# Patient Record
Sex: MALE | Race: WHITE | NOT HISPANIC OR LATINO | Employment: OTHER | URBAN - METROPOLITAN AREA
[De-identification: names, ages, dates, MRNs, and addresses within clinical notes are randomized per-mention and may not be internally consistent; named-entity substitution may affect disease eponyms.]

---

## 2022-07-09 ENCOUNTER — APPOINTMENT (EMERGENCY)
Dept: RADIOLOGY | Facility: HOSPITAL | Age: 37
DRG: 493 | End: 2022-07-09
Payer: COMMERCIAL

## 2022-07-09 ENCOUNTER — HOSPITAL ENCOUNTER (EMERGENCY)
Facility: HOSPITAL | Age: 37
Discharge: ANOTHER HOSPITAL | End: 2022-07-09
Attending: EMERGENCY MEDICINE
Payer: COMMERCIAL

## 2022-07-09 ENCOUNTER — APPOINTMENT (INPATIENT)
Dept: RADIOLOGY | Facility: HOSPITAL | Age: 37
DRG: 493 | End: 2022-07-09
Payer: COMMERCIAL

## 2022-07-09 ENCOUNTER — HOSPITAL ENCOUNTER (INPATIENT)
Facility: HOSPITAL | Age: 37
LOS: 4 days | Discharge: HOME | DRG: 493 | End: 2022-07-13
Attending: SURGERY | Admitting: SURGERY
Payer: COMMERCIAL

## 2022-07-09 ENCOUNTER — APPOINTMENT (EMERGENCY)
Dept: RADIOLOGY | Facility: HOSPITAL | Age: 37
End: 2022-07-09
Payer: COMMERCIAL

## 2022-07-09 VITALS
SYSTOLIC BLOOD PRESSURE: 149 MMHG | BODY MASS INDEX: 26.87 KG/M2 | HEART RATE: 74 BPM | OXYGEN SATURATION: 99 % | WEIGHT: 198.41 LBS | DIASTOLIC BLOOD PRESSURE: 86 MMHG | RESPIRATION RATE: 18 BRPM | HEIGHT: 72 IN

## 2022-07-09 DIAGNOSIS — S82.202A CLOSED FRACTURE OF LEFT TIBIA AND FIBULA, INITIAL ENCOUNTER: Primary | ICD-10-CM

## 2022-07-09 DIAGNOSIS — S82.402A CLOSED FRACTURE OF LEFT TIBIA AND FIBULA, INITIAL ENCOUNTER: Primary | ICD-10-CM

## 2022-07-09 DIAGNOSIS — S82.209A TIBIAL FRACTURE: ICD-10-CM

## 2022-07-09 DIAGNOSIS — S51.011A ELBOW LACERATION, RIGHT, INITIAL ENCOUNTER: ICD-10-CM
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
## 2022-07-09 PROBLEM — F43.10 PTSD (POST-TRAUMATIC STRESS DISORDER): Status: ACTIVE | Noted: 2022-07-09

## 2022-07-09 PROBLEM — M25.532 LEFT WRIST PAIN: Status: ACTIVE | Noted: 2022-07-09
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
## 2022-07-09 LAB
ALBUMIN SERPL BCP-MCNC: 4.1 G/DL (ref 3.5–5)
ALP SERPL-CCNC: 55 U/L (ref 46–116)
ALT SERPL W P-5'-P-CCNC: 30 U/L (ref 12–78)
ANION GAP SERPL CALCULATED.3IONS-SCNC: 12 MMOL/L (ref 4–13)
APTT PPP: 24 SECONDS (ref 23–37)
AST SERPL W P-5'-P-CCNC: 25 U/L (ref 5–45)
ATRIAL RATE: 65 BPM
BASOPHILS # BLD AUTO: 0.05 THOUSANDS/ΜL (ref 0–0.1)
BASOPHILS NFR BLD AUTO: 1 % (ref 0–1)
BILIRUB SERPL-MCNC: 0.75 MG/DL (ref 0.2–1)
BUN SERPL-MCNC: 18 MG/DL (ref 5–25)
CALCIUM SERPL-MCNC: 9.1 MG/DL (ref 8.3–10.1)
CHLORIDE SERPL-SCNC: 106 MMOL/L (ref 100–108)
CO2 SERPL-SCNC: 24 MMOL/L (ref 21–32)
CREAT SERPL-MCNC: 1.27 MG/DL (ref 0.6–1.3)
EOSINOPHIL # BLD AUTO: 0.03 THOUSAND/ΜL (ref 0–0.61)
EOSINOPHIL NFR BLD AUTO: 0 % (ref 0–6)
ERYTHROCYTE [DISTWIDTH] IN BLOOD BY AUTOMATED COUNT: 12.7 % (ref 11.6–15.1)
GFR SERPL CREATININE-BSD FRML MDRD: 70 ML/MIN/1.73SQ M
GLUCOSE SERPL-MCNC: 110 MG/DL (ref 65–140)
HCT VFR BLD AUTO: 42.3 % (ref 36.5–49.3)
HGB BLD-MCNC: 14.1 G/DL (ref 12–17)
IMM GRANULOCYTES # BLD AUTO: 0.03 THOUSAND/UL (ref 0–0.2)
IMM GRANULOCYTES NFR BLD AUTO: 0 % (ref 0–2)
INR PPP: 0.98 (ref 0.84–1.19)
LYMPHOCYTES # BLD AUTO: 1.76 THOUSANDS/ΜL (ref 0.6–4.47)
LYMPHOCYTES NFR BLD AUTO: 19 % (ref 14–44)
MCH RBC QN AUTO: 31.4 PG (ref 26.8–34.3)
MCHC RBC AUTO-ENTMCNC: 33.3 G/DL (ref 31.4–37.4)
MCV RBC AUTO: 94 FL (ref 82–98)
MONOCYTES # BLD AUTO: 0.88 THOUSAND/ΜL (ref 0.17–1.22)
MONOCYTES NFR BLD AUTO: 10 % (ref 4–12)
NEUTROPHILS # BLD AUTO: 6.47 THOUSANDS/ΜL (ref 1.85–7.62)
NEUTS SEG NFR BLD AUTO: 70 % (ref 43–75)
NRBC BLD AUTO-RTO: 0 /100 WBCS
P AXIS: 45 DEGREES
PLATELET # BLD AUTO: 217 THOUSANDS/UL (ref 149–390)
PMV BLD AUTO: 10.2 FL (ref 8.9–12.7)
POTASSIUM SERPL-SCNC: 3.9 MMOL/L (ref 3.5–5.3)
PR INTERVAL: 136 MS
PROT SERPL-MCNC: 7.2 G/DL (ref 6.4–8.2)
PROTHROMBIN TIME: 12.8 SECONDS (ref 11.6–14.5)
QRS AXIS: 62 DEGREES
QRSD INTERVAL: 90 MS
QT INTERVAL: 396 MS
QTC INTERVAL: 411 MS
RBC # BLD AUTO: 4.49 MILLION/UL (ref 3.88–5.62)
SODIUM SERPL-SCNC: 142 MMOL/L (ref 136–145)
T WAVE AXIS: 53 DEGREES
VENTRICULAR RATE: 65 BPM
WBC # BLD AUTO: 9.22 THOUSAND/UL (ref 4.31–10.16)

## 2022-07-09 PROCEDURE — 90471 IMMUNIZATION ADMIN: CPT

## 2022-07-09 PROCEDURE — 90715 TDAP VACCINE 7 YRS/> IM: CPT | Performed by: EMERGENCY MEDICINE

## 2022-07-09 PROCEDURE — 96374 THER/PROPH/DIAG INJ IV PUSH: CPT

## 2022-07-09 PROCEDURE — 99285 EMERGENCY DEPT VISIT HI MDM: CPT

## 2022-07-09 PROCEDURE — 73590 X-RAY EXAM OF LOWER LEG: CPT

## 2022-07-09 PROCEDURE — 85730 THROMBOPLASTIN TIME PARTIAL: CPT | Performed by: EMERGENCY MEDICINE

## 2022-07-09 PROCEDURE — 0HQDXZZ REPAIR RIGHT LOWER ARM SKIN, EXTERNAL APPROACH: ICD-10-PCS | Performed by: SURGERY

## 2022-07-09 PROCEDURE — 80053 COMPREHEN METABOLIC PANEL: CPT | Performed by: EMERGENCY MEDICINE

## 2022-07-09 PROCEDURE — 12002 RPR S/N/AX/GEN/TRNK2.6-7.5CM: CPT | Performed by: SURGERY

## 2022-07-09 PROCEDURE — 73110 X-RAY EXAM OF WRIST: CPT

## 2022-07-09 PROCEDURE — 93010 ELECTROCARDIOGRAM REPORT: CPT | Performed by: INTERNAL MEDICINE

## 2022-07-09 PROCEDURE — 93005 ELECTROCARDIOGRAM TRACING: CPT

## 2022-07-09 PROCEDURE — 73080 X-RAY EXAM OF ELBOW: CPT

## 2022-07-09 PROCEDURE — 36415 COLL VENOUS BLD VENIPUNCTURE: CPT | Performed by: EMERGENCY MEDICINE

## 2022-07-09 PROCEDURE — 73700 CT LOWER EXTREMITY W/O DYE: CPT

## 2022-07-09 PROCEDURE — 99285 EMERGENCY DEPT VISIT HI MDM: CPT | Performed by: EMERGENCY MEDICINE

## 2022-07-09 PROCEDURE — 71045 X-RAY EXAM CHEST 1 VIEW: CPT

## 2022-07-09 PROCEDURE — G1004 CDSM NDSC: HCPCS

## 2022-07-09 PROCEDURE — 85610 PROTHROMBIN TIME: CPT | Performed by: EMERGENCY MEDICINE

## 2022-07-09 PROCEDURE — 96376 TX/PRO/DX INJ SAME DRUG ADON: CPT

## 2022-07-09 PROCEDURE — 85025 COMPLETE CBC W/AUTO DIFF WBC: CPT | Performed by: EMERGENCY MEDICINE

## 2022-07-09 PROCEDURE — 99223 1ST HOSP IP/OBS HIGH 75: CPT | Performed by: SURGERY

## 2022-07-09 PROCEDURE — 73090 X-RAY EXAM OF FOREARM: CPT

## 2022-07-09 RX ORDER — GINSENG 100 MG
1 CAPSULE ORAL ONCE
Status: COMPLETED | OUTPATIENT
Start: 2022-07-09 | End: 2022-07-09

## 2022-07-09 RX ORDER — OXYCODONE HYDROCHLORIDE 10 MG/1
10 TABLET ORAL EVERY 4 HOURS PRN
Status: DISCONTINUED | OUTPATIENT
Start: 2022-07-09 | End: 2022-07-11

## 2022-07-09 RX ORDER — OXYCODONE HYDROCHLORIDE 5 MG/1
5 TABLET ORAL EVERY 4 HOURS PRN
Status: DISCONTINUED | OUTPATIENT
Start: 2022-07-09 | End: 2022-07-11

## 2022-07-09 RX ORDER — ACETAMINOPHEN 325 MG/1
975 TABLET ORAL EVERY 6 HOURS SCHEDULED
Status: DISCONTINUED | OUTPATIENT
Start: 2022-07-10 | End: 2022-07-13 | Stop reason: HOSPADM

## 2022-07-09 RX ORDER — GABAPENTIN 100 MG/1
100 CAPSULE ORAL
COMMUNITY
End: 2022-07-13

## 2022-07-09 RX ORDER — HYDROMORPHONE HCL/PF 1 MG/ML
1 SYRINGE (ML) INJECTION ONCE
Status: COMPLETED | OUTPATIENT
Start: 2022-07-09 | End: 2022-07-09

## 2022-07-09 RX ORDER — SODIUM CHLORIDE, SODIUM GLUCONATE, SODIUM ACETATE, POTASSIUM CHLORIDE, MAGNESIUM CHLORIDE, SODIUM PHOSPHATE, DIBASIC, AND POTASSIUM PHOSPHATE .53; .5; .37; .037; .03; .012; .00082 G/100ML; G/100ML; G/100ML; G/100ML; G/100ML; G/100ML; G/100ML
125 INJECTION, SOLUTION INTRAVENOUS CONTINUOUS
Status: DISCONTINUED | OUTPATIENT
Start: 2022-07-09 | End: 2022-07-10

## 2022-07-09 RX ORDER — DULOXETIN HYDROCHLORIDE 60 MG/1
160 CAPSULE, DELAYED RELEASE ORAL
COMMUNITY

## 2022-07-09 RX ORDER — AMOXICILLIN 250 MG
2 CAPSULE ORAL DAILY
Status: DISCONTINUED | OUTPATIENT
Start: 2022-07-10 | End: 2022-07-13 | Stop reason: HOSPADM

## 2022-07-09 RX ORDER — ENOXAPARIN SODIUM 100 MG/ML
30 INJECTION SUBCUTANEOUS EVERY 12 HOURS
Status: DISCONTINUED | OUTPATIENT
Start: 2022-07-09 | End: 2022-07-10

## 2022-07-09 RX ORDER — GABAPENTIN 100 MG/1
100 CAPSULE ORAL
Status: DISCONTINUED | OUTPATIENT
Start: 2022-07-09 | End: 2022-07-11

## 2022-07-09 RX ORDER — FENTANYL CITRATE 50 UG/ML
1 INJECTION, SOLUTION INTRAMUSCULAR; INTRAVENOUS ONCE
Status: COMPLETED | OUTPATIENT
Start: 2022-07-09 | End: 2022-07-09

## 2022-07-09 RX ORDER — LIDOCAINE HYDROCHLORIDE AND EPINEPHRINE 10; 10 MG/ML; UG/ML
10 INJECTION, SOLUTION INFILTRATION; PERINEURAL ONCE
Status: COMPLETED | OUTPATIENT
Start: 2022-07-09 | End: 2022-07-09

## 2022-07-09 RX ORDER — HYDROMORPHONE HCL/PF 1 MG/ML
0.5 SYRINGE (ML) INJECTION
Status: DISCONTINUED | OUTPATIENT
Start: 2022-07-09 | End: 2022-07-13

## 2022-07-09 RX ADMIN — GABAPENTIN 100 MG: 100 CAPSULE ORAL at 23:07

## 2022-07-09 RX ADMIN — ENOXAPARIN SODIUM 30 MG: 30 INJECTION SUBCUTANEOUS at 23:12

## 2022-07-09 RX ADMIN — BACITRACIN 1 SMALL APPLICATION: 500 OINTMENT TOPICAL at 22:24

## 2022-07-09 RX ADMIN — SODIUM CHLORIDE, SODIUM GLUCONATE, SODIUM ACETATE, POTASSIUM CHLORIDE, MAGNESIUM CHLORIDE, SODIUM PHOSPHATE, DIBASIC, AND POTASSIUM PHOSPHATE 125 ML/HR: .53; .5; .37; .037; .03; .012; .00082 INJECTION, SOLUTION INTRAVENOUS at 23:07

## 2022-07-09 RX ADMIN — HYDROMORPHONE HYDROCHLORIDE 1 MG: 1 INJECTION, SOLUTION INTRAMUSCULAR; INTRAVENOUS; SUBCUTANEOUS at 19:48

## 2022-07-09 RX ADMIN — HYDROMORPHONE HYDROCHLORIDE 1 MG: 1 INJECTION, SOLUTION INTRAMUSCULAR; INTRAVENOUS; SUBCUTANEOUS at 18:48

## 2022-07-09 RX ADMIN — TETANUS TOXOID, REDUCED DIPHTHERIA TOXOID AND ACELLULAR PERTUSSIS VACCINE, ADSORBED 0.5 ML: 5; 2.5; 8; 8; 2.5 SUSPENSION INTRAMUSCULAR at 18:46

## 2022-07-09 RX ADMIN — LIDOCAINE HYDROCHLORIDE,EPINEPHRINE BITARTRATE 10 ML: 10; .01 INJECTION, SOLUTION INFILTRATION; PERINEURAL at 21:22

## 2022-07-09 RX ADMIN — ACETAMINOPHEN 975 MG: 325 TABLET, FILM COATED ORAL at 23:07

## 2022-07-09 RX ADMIN — HYDROMORPHONE HYDROCHLORIDE 1 MG: 1 INJECTION, SOLUTION INTRAMUSCULAR; INTRAVENOUS; SUBCUTANEOUS at 22:23

## 2022-07-09 NOTE — ED TRIAGE NOTES
Pt  Right elbow wrapped in cling - undressed & cleansed, left leg deformed  Pt  Repositioned in bed with distress
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
## 2022-07-10 ENCOUNTER — ANESTHESIA (INPATIENT)
Dept: PERIOP | Facility: HOSPITAL | Age: 37
DRG: 493 | End: 2022-07-10
Payer: COMMERCIAL

## 2022-07-10 ENCOUNTER — APPOINTMENT (INPATIENT)
Dept: RADIOLOGY | Facility: HOSPITAL | Age: 37
DRG: 493 | End: 2022-07-10
Payer: COMMERCIAL

## 2022-07-10 ENCOUNTER — ANESTHESIA EVENT (INPATIENT)
Dept: PERIOP | Facility: HOSPITAL | Age: 37
DRG: 493 | End: 2022-07-10
Payer: COMMERCIAL

## 2022-07-10 LAB
ABO GROUP BLD: NORMAL
ABO GROUP BLD: NORMAL
ANION GAP SERPL CALCULATED.3IONS-SCNC: 2 MMOL/L (ref 4–13)
BLD GP AB SCN SERPL QL: NEGATIVE
BUN SERPL-MCNC: 20 MG/DL (ref 5–25)
CALCIUM SERPL-MCNC: 8.6 MG/DL (ref 8.3–10.1)
CHLORIDE SERPL-SCNC: 110 MMOL/L (ref 100–108)
CO2 SERPL-SCNC: 26 MMOL/L (ref 21–32)
CREAT SERPL-MCNC: 1.2 MG/DL (ref 0.6–1.3)
ERYTHROCYTE [DISTWIDTH] IN BLOOD BY AUTOMATED COUNT: 13 % (ref 11.6–15.1)
GFR SERPL CREATININE-BSD FRML MDRD: 75 ML/MIN/1.73SQ M
GLUCOSE SERPL-MCNC: 95 MG/DL (ref 65–140)
HCT VFR BLD AUTO: 38.5 % (ref 36.5–49.3)
HGB BLD-MCNC: 12.9 G/DL (ref 12–17)
MCH RBC QN AUTO: 31.9 PG (ref 26.8–34.3)
MCHC RBC AUTO-ENTMCNC: 33.5 G/DL (ref 31.4–37.4)
MCV RBC AUTO: 95 FL (ref 82–98)
PLATELET # BLD AUTO: 179 THOUSANDS/UL (ref 149–390)
PMV BLD AUTO: 10.4 FL (ref 8.9–12.7)
POTASSIUM SERPL-SCNC: 4.8 MMOL/L (ref 3.5–5.3)
RBC # BLD AUTO: 4.05 MILLION/UL (ref 3.88–5.62)
RH BLD: POSITIVE
RH BLD: POSITIVE
SODIUM SERPL-SCNC: 138 MMOL/L (ref 136–145)
SPECIMEN EXPIRATION DATE: NORMAL
WBC # BLD AUTO: 9.89 THOUSAND/UL (ref 4.31–10.16)

## 2022-07-10 PROCEDURE — 27759 TREATMENT OF TIBIA FRACTURE: CPT | Performed by: ORTHOPAEDIC SURGERY

## 2022-07-10 PROCEDURE — 36415 COLL VENOUS BLD VENIPUNCTURE: CPT | Performed by: PHYSICIAN ASSISTANT

## 2022-07-10 PROCEDURE — 0QSH04Z REPOSITION LEFT TIBIA WITH INTERNAL FIXATION DEVICE, OPEN APPROACH: ICD-10-PCS | Performed by: SURGERY

## 2022-07-10 PROCEDURE — C1713 ANCHOR/SCREW BN/BN,TIS/BN: HCPCS | Performed by: ORTHOPAEDIC SURGERY

## 2022-07-10 PROCEDURE — 86901 BLOOD TYPING SEROLOGIC RH(D): CPT | Performed by: PHYSICIAN ASSISTANT

## 2022-07-10 PROCEDURE — 85027 COMPLETE CBC AUTOMATED: CPT | Performed by: EMERGENCY MEDICINE

## 2022-07-10 PROCEDURE — 27769 OPTX POST ANKLE FX: CPT | Performed by: ORTHOPAEDIC SURGERY

## 2022-07-10 PROCEDURE — 99223 1ST HOSP IP/OBS HIGH 75: CPT | Performed by: ORTHOPAEDIC SURGERY

## 2022-07-10 PROCEDURE — C1769 GUIDE WIRE: HCPCS | Performed by: ORTHOPAEDIC SURGERY

## 2022-07-10 PROCEDURE — 0QSH06Z REPOSITION LEFT TIBIA WITH INTRAMEDULLARY INTERNAL FIXATION DEVICE, OPEN APPROACH: ICD-10-PCS | Performed by: SURGERY

## 2022-07-10 PROCEDURE — 86850 RBC ANTIBODY SCREEN: CPT | Performed by: PHYSICIAN ASSISTANT

## 2022-07-10 PROCEDURE — 99233 SBSQ HOSP IP/OBS HIGH 50: CPT | Performed by: SURGERY

## 2022-07-10 PROCEDURE — 86900 BLOOD TYPING SEROLOGIC ABO: CPT | Performed by: PHYSICIAN ASSISTANT

## 2022-07-10 PROCEDURE — 80048 BASIC METABOLIC PNL TOTAL CA: CPT | Performed by: EMERGENCY MEDICINE

## 2022-07-10 PROCEDURE — 73590 X-RAY EXAM OF LOWER LEG: CPT

## 2022-07-10 DEVICE — TIBIAL NAIL-ADVANCED / 10MM 375MM / STERILE: Type: IMPLANTABLE DEVICE | Site: TIBIA | Status: FUNCTIONAL

## 2022-07-10 DEVICE — 2.7MM CORTEX SCREW SELF-TAPPING 45MM: Type: IMPLANTABLE DEVICE | Site: TIBIA | Status: FUNCTIONAL

## 2022-07-10 DEVICE — LOCKING SCREW FOR IM NAIL Ø 5MM/ 38MM/ XL25/ STERILE: Type: IMPLANTABLE DEVICE | Site: TIBIA | Status: FUNCTIONAL

## 2022-07-10 DEVICE — LOCKING SCREW FOR IM NAIL Ø 5MM/ 48MM/ XL25/ STERILE: Type: IMPLANTABLE DEVICE | Site: TIBIA | Status: FUNCTIONAL

## 2022-07-10 DEVICE — LOCKING SCREW FOR IM NAIL Ø 5MM/ 42MM/ XL25/ STERILE: Type: IMPLANTABLE DEVICE | Site: TIBIA | Status: FUNCTIONAL

## 2022-07-10 DEVICE — LOCKING SCREW FOR IM NAIL Ø 5MM/ 40MM/ XL25/ STERILE: Type: IMPLANTABLE DEVICE | Site: TIBIA | Status: FUNCTIONAL

## 2022-07-10 RX ORDER — ENOXAPARIN SODIUM 100 MG/ML
30 INJECTION SUBCUTANEOUS EVERY 12 HOURS
Status: DISCONTINUED | OUTPATIENT
Start: 2022-07-10 | End: 2022-07-13 | Stop reason: HOSPADM

## 2022-07-10 RX ORDER — CEFAZOLIN SODIUM 2 G/50ML
2000 SOLUTION INTRAVENOUS
Status: COMPLETED | OUTPATIENT
Start: 2022-07-10 | End: 2022-07-10

## 2022-07-10 RX ORDER — CEFAZOLIN SODIUM 2 G/50ML
2000 SOLUTION INTRAVENOUS EVERY 8 HOURS
Status: COMPLETED | OUTPATIENT
Start: 2022-07-10 | End: 2022-07-11

## 2022-07-10 RX ORDER — HYDROMORPHONE HCL/PF 1 MG/ML
SYRINGE (ML) INJECTION AS NEEDED
Status: DISCONTINUED | OUTPATIENT
Start: 2022-07-10 | End: 2022-07-10

## 2022-07-10 RX ORDER — NEOSTIGMINE METHYLSULFATE 1 MG/ML
INJECTION INTRAVENOUS AS NEEDED
Status: DISCONTINUED | OUTPATIENT
Start: 2022-07-10 | End: 2022-07-10

## 2022-07-10 RX ORDER — PROPOFOL 10 MG/ML
INJECTION, EMULSION INTRAVENOUS AS NEEDED
Status: DISCONTINUED | OUTPATIENT
Start: 2022-07-10 | End: 2022-07-10

## 2022-07-10 RX ORDER — FENTANYL CITRATE/PF 50 MCG/ML
25 SYRINGE (ML) INJECTION
Status: DISCONTINUED | OUTPATIENT
Start: 2022-07-10 | End: 2022-07-10 | Stop reason: HOSPADM

## 2022-07-10 RX ORDER — ONDANSETRON 2 MG/ML
INJECTION INTRAMUSCULAR; INTRAVENOUS AS NEEDED
Status: DISCONTINUED | OUTPATIENT
Start: 2022-07-10 | End: 2022-07-10

## 2022-07-10 RX ORDER — SODIUM CHLORIDE, SODIUM GLUCONATE, SODIUM ACETATE, POTASSIUM CHLORIDE, MAGNESIUM CHLORIDE, SODIUM PHOSPHATE, DIBASIC, AND POTASSIUM PHOSPHATE .53; .5; .37; .037; .03; .012; .00082 G/100ML; G/100ML; G/100ML; G/100ML; G/100ML; G/100ML; G/100ML
75 INJECTION, SOLUTION INTRAVENOUS CONTINUOUS
Status: DISPENSED | OUTPATIENT
Start: 2022-07-10 | End: 2022-07-10

## 2022-07-10 RX ORDER — DEXAMETHASONE SODIUM PHOSPHATE 10 MG/ML
INJECTION, SOLUTION INTRAMUSCULAR; INTRAVENOUS AS NEEDED
Status: DISCONTINUED | OUTPATIENT
Start: 2022-07-10 | End: 2022-07-10

## 2022-07-10 RX ORDER — MIDAZOLAM HYDROCHLORIDE 2 MG/2ML
INJECTION, SOLUTION INTRAMUSCULAR; INTRAVENOUS AS NEEDED
Status: DISCONTINUED | OUTPATIENT
Start: 2022-07-10 | End: 2022-07-10

## 2022-07-10 RX ORDER — ROCURONIUM BROMIDE 10 MG/ML
INJECTION, SOLUTION INTRAVENOUS AS NEEDED
Status: DISCONTINUED | OUTPATIENT
Start: 2022-07-10 | End: 2022-07-10

## 2022-07-10 RX ORDER — DIPHENHYDRAMINE HCL 25 MG
25 TABLET ORAL EVERY 6 HOURS PRN
Status: DISCONTINUED | OUTPATIENT
Start: 2022-07-10 | End: 2022-07-13 | Stop reason: HOSPADM

## 2022-07-10 RX ORDER — LIDOCAINE HYDROCHLORIDE 10 MG/ML
INJECTION, SOLUTION EPIDURAL; INFILTRATION; INTRACAUDAL; PERINEURAL AS NEEDED
Status: DISCONTINUED | OUTPATIENT
Start: 2022-07-10 | End: 2022-07-10

## 2022-07-10 RX ORDER — GLYCOPYRROLATE 0.2 MG/ML
INJECTION INTRAMUSCULAR; INTRAVENOUS AS NEEDED
Status: DISCONTINUED | OUTPATIENT
Start: 2022-07-10 | End: 2022-07-10

## 2022-07-10 RX ORDER — HYDROMORPHONE HCL/PF 1 MG/ML
0.5 SYRINGE (ML) INJECTION
Status: DISCONTINUED | OUTPATIENT
Start: 2022-07-10 | End: 2022-07-10 | Stop reason: HOSPADM

## 2022-07-10 RX ORDER — ONDANSETRON 2 MG/ML
4 INJECTION INTRAMUSCULAR; INTRAVENOUS ONCE AS NEEDED
Status: DISCONTINUED | OUTPATIENT
Start: 2022-07-10 | End: 2022-07-10 | Stop reason: HOSPADM

## 2022-07-10 RX ORDER — FENTANYL CITRATE 50 UG/ML
INJECTION, SOLUTION INTRAMUSCULAR; INTRAVENOUS AS NEEDED
Status: DISCONTINUED | OUTPATIENT
Start: 2022-07-10 | End: 2022-07-10

## 2022-07-10 RX ORDER — SODIUM CHLORIDE, SODIUM LACTATE, POTASSIUM CHLORIDE, CALCIUM CHLORIDE 600; 310; 30; 20 MG/100ML; MG/100ML; MG/100ML; MG/100ML
INJECTION, SOLUTION INTRAVENOUS CONTINUOUS PRN
Status: DISCONTINUED | OUTPATIENT
Start: 2022-07-10 | End: 2022-07-10

## 2022-07-10 RX ADMIN — SODIUM CHLORIDE, SODIUM LACTATE, POTASSIUM CHLORIDE, AND CALCIUM CHLORIDE: .6; .31; .03; .02 INJECTION, SOLUTION INTRAVENOUS at 08:59

## 2022-07-10 RX ADMIN — HYDROMORPHONE HYDROCHLORIDE 0.5 MG: 1 INJECTION, SOLUTION INTRAMUSCULAR; INTRAVENOUS; SUBCUTANEOUS at 13:45

## 2022-07-10 RX ADMIN — NEOSTIGMINE METHYLSULFATE 3 MG: 1 INJECTION INTRAVENOUS at 10:44

## 2022-07-10 RX ADMIN — SODIUM CHLORIDE, SODIUM GLUCONATE, SODIUM ACETATE, POTASSIUM CHLORIDE, MAGNESIUM CHLORIDE, SODIUM PHOSPHATE, DIBASIC, AND POTASSIUM PHOSPHATE 75 ML/HR: .53; .5; .37; .037; .03; .012; .00082 INJECTION, SOLUTION INTRAVENOUS at 11:36

## 2022-07-10 RX ADMIN — FENTANYL CITRATE 25 MCG: 50 INJECTION INTRAMUSCULAR; INTRAVENOUS at 12:48

## 2022-07-10 RX ADMIN — ENOXAPARIN SODIUM 30 MG: 30 INJECTION SUBCUTANEOUS at 21:48

## 2022-07-10 RX ADMIN — FENTANYL CITRATE 50 MCG: 50 INJECTION INTRAMUSCULAR; INTRAVENOUS at 11:09

## 2022-07-10 RX ADMIN — HYDROMORPHONE HYDROCHLORIDE 0.5 MG: 1 INJECTION, SOLUTION INTRAMUSCULAR; INTRAVENOUS; SUBCUTANEOUS at 03:03

## 2022-07-10 RX ADMIN — OXYCODONE HYDROCHLORIDE 10 MG: 10 TABLET ORAL at 21:51

## 2022-07-10 RX ADMIN — CEFAZOLIN SODIUM 2000 MG: 2 SOLUTION INTRAVENOUS at 19:30

## 2022-07-10 RX ADMIN — HYDROMORPHONE HYDROCHLORIDE 0.5 MG: 1 INJECTION, SOLUTION INTRAMUSCULAR; INTRAVENOUS; SUBCUTANEOUS at 19:30

## 2022-07-10 RX ADMIN — FENTANYL CITRATE 25 MCG: 50 INJECTION INTRAMUSCULAR; INTRAVENOUS at 12:45

## 2022-07-10 RX ADMIN — HYDROMORPHONE HYDROCHLORIDE 0.5 MG: 1 INJECTION, SOLUTION INTRAMUSCULAR; INTRAVENOUS; SUBCUTANEOUS at 10:41

## 2022-07-10 RX ADMIN — DEXAMETHASONE SODIUM PHOSPHATE 4 MG: 10 INJECTION, SOLUTION INTRAMUSCULAR; INTRAVENOUS at 08:29

## 2022-07-10 RX ADMIN — CEFAZOLIN SODIUM 2000 MG: 2 SOLUTION INTRAVENOUS at 08:29

## 2022-07-10 RX ADMIN — HYDROMORPHONE HYDROCHLORIDE 0.5 MG: 1 INJECTION, SOLUTION INTRAMUSCULAR; INTRAVENOUS; SUBCUTANEOUS at 16:10

## 2022-07-10 RX ADMIN — FENTANYL CITRATE 25 MCG: 50 INJECTION INTRAMUSCULAR; INTRAVENOUS at 13:00

## 2022-07-10 RX ADMIN — ONDANSETRON 4 MG: 2 INJECTION INTRAMUSCULAR; INTRAVENOUS at 10:35

## 2022-07-10 RX ADMIN — HYDROMORPHONE HYDROCHLORIDE 0.5 MG: 1 INJECTION, SOLUTION INTRAMUSCULAR; INTRAVENOUS; SUBCUTANEOUS at 09:02

## 2022-07-10 RX ADMIN — LIDOCAINE HYDROCHLORIDE 50 MG: 10 INJECTION, SOLUTION EPIDURAL; INFILTRATION; INTRACAUDAL at 08:21

## 2022-07-10 RX ADMIN — OXYCODONE HYDROCHLORIDE 10 MG: 10 TABLET ORAL at 17:14

## 2022-07-10 RX ADMIN — FENTANYL CITRATE 50 MCG: 50 INJECTION INTRAMUSCULAR; INTRAVENOUS at 08:46

## 2022-07-10 RX ADMIN — ACETAMINOPHEN 975 MG: 325 TABLET, FILM COATED ORAL at 06:17

## 2022-07-10 RX ADMIN — ACETAMINOPHEN 975 MG: 325 TABLET, FILM COATED ORAL at 23:12

## 2022-07-10 RX ADMIN — HYDROMORPHONE HYDROCHLORIDE 0.5 MG: 1 INJECTION, SOLUTION INTRAMUSCULAR; INTRAVENOUS; SUBCUTANEOUS at 00:37

## 2022-07-10 RX ADMIN — PROPOFOL 200 MG: 10 INJECTION, EMULSION INTRAVENOUS at 08:21

## 2022-07-10 RX ADMIN — OXYCODONE HYDROCHLORIDE 5 MG: 5 TABLET ORAL at 03:03

## 2022-07-10 RX ADMIN — GABAPENTIN 100 MG: 100 CAPSULE ORAL at 21:46

## 2022-07-10 RX ADMIN — MIDAZOLAM 2 MG: 1 INJECTION INTRAMUSCULAR; INTRAVENOUS at 08:13

## 2022-07-10 RX ADMIN — PROPOFOL 50 MG: 10 INJECTION, EMULSION INTRAVENOUS at 10:47

## 2022-07-10 RX ADMIN — HYDROMORPHONE HYDROCHLORIDE 0.5 MG: 1 INJECTION, SOLUTION INTRAMUSCULAR; INTRAVENOUS; SUBCUTANEOUS at 13:15

## 2022-07-10 RX ADMIN — DULOXETINE 160 MG: 20 CAPSULE, DELAYED RELEASE ORAL at 21:46

## 2022-07-10 RX ADMIN — HYDROMORPHONE HYDROCHLORIDE 0.5 MG: 1 INJECTION, SOLUTION INTRAMUSCULAR; INTRAVENOUS; SUBCUTANEOUS at 23:12

## 2022-07-10 RX ADMIN — ROCURONIUM BROMIDE 50 MG: 50 INJECTION, SOLUTION INTRAVENOUS at 08:21

## 2022-07-10 RX ADMIN — GLYCOPYRROLATE 0.4 MG: 0.2 INJECTION INTRAMUSCULAR; INTRAVENOUS at 10:44

## 2022-07-10 RX ADMIN — DULOXETINE 160 MG: 20 CAPSULE, DELAYED RELEASE ORAL at 00:32

## 2022-07-10 RX ADMIN — FENTANYL CITRATE 50 MCG: 50 INJECTION INTRAMUSCULAR; INTRAVENOUS at 08:21

## 2022-07-10 RX ADMIN — FENTANYL CITRATE 25 MCG: 50 INJECTION INTRAMUSCULAR; INTRAVENOUS at 12:55

## 2022-07-10 NOTE — ASSESSMENT & PLAN NOTE
-Repaired on admission 07/09  -5 simple interrupted sutures  -Local wound care  -Will need removal in 10-14 days  -Tetanus updated
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
## 2022-07-10 NOTE — QUICK NOTE
Post Op Check:    Patients pain is well controlled  They denied any nausea, chest pain, or shortness of breath  General: NAD  HENT: NCAT MMM  Neck: supple, no JVD  CV: nl rate  Lungs: nl wob   No resp distress  ABD: Soft, nontender, nondistended  Extrem: No CCE  Neuro: AAOx3     Plan:  Diet Regular; Regular House  Continue to monitor  Pain and nausea control PRN    Miguel Vallecillo MD  Surgery, PGY-1
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
## 2022-07-10 NOTE — ASSESSMENT & PLAN NOTE
-Ortho Consulted and following along    - OR today for repair of left tib/fib fx  - Multimodal pain control  - Antiemetics PRN  - Bowel Reg
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
## 2022-07-10 NOTE — ANESTHESIA POSTPROCEDURE EVALUATION
Post-Op Assessment Note    CV Status:  Stable  Pain Score: 0    Pain management: adequate     Mental Status:  Arousable and sleepy   Hydration Status:  Euvolemic   PONV Controlled:  Controlled   Airway Patency:  Patent      Post Op Vitals Reviewed: Yes      Staff: CRNA, Anesthesiologist         No complications documented      /87 (07/10/22 1113)    Temp 97 7 °F (36 5 °C) (07/10/22 1113)    Pulse 75 (07/10/22 1113)   Resp 20 (07/10/22 1113)    SpO2 97 % (07/10/22 1113)
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
## 2022-07-10 NOTE — ASSESSMENT & PLAN NOTE
-Repaired on admission 07/09  -5 simple interrupted sutures  -Local wound care  -Will need removal in 1 week  -Tetanus updated

## 2022-07-10 NOTE — PROCEDURES
Laceration repair    Date/Time: 7/9/2022 10:07 PM  Performed by: Fabrizio Cabrera MD  Authorized by: Fabrizio Cabrera MD   Consent: Verbal consent obtained  Risks and benefits: risks, benefits and alternatives were discussed  Consent given by: patient  Patient understanding: patient states understanding of the procedure being performed  Patient identity confirmed: verbally with patient and arm band  Body area: upper extremity (Right elbow)  Laceration length: 7 cm  Tendon involvement: none  Nerve involvement: none  Vascular damage: no  Anesthesia: local infiltration    Anesthesia:  Local Anesthetic: lidocaine 1% with epinephrine  Anesthetic total: 4 mL    Sedation:  Patient sedated: no      Wound Dehiscence:  Superficial Wound Dehiscence: simple closure      Procedure Details:  Preparation: Patient was prepped and draped in the usual sterile fashion    Irrigation solution: saline  Irrigation method: jet lavage  Amount of cleaning: standard  Debridement: none  Degree of undermining: none  Skin closure: 4-0 Prolene  Number of sutures: 5  Technique: simple  Approximation: close  Approximation difficulty: simple  Dressing: antibiotic ointment and 4x4 sterile gauze  Patient tolerance: patient tolerated the procedure well with no immediate complications

## 2022-07-11 LAB
ANION GAP SERPL CALCULATED.3IONS-SCNC: 4 MMOL/L (ref 4–13)
BUN SERPL-MCNC: 14 MG/DL (ref 5–25)
CALCIUM SERPL-MCNC: 8.6 MG/DL (ref 8.3–10.1)
CHLORIDE SERPL-SCNC: 106 MMOL/L (ref 100–108)
CO2 SERPL-SCNC: 29 MMOL/L (ref 21–32)
CREAT SERPL-MCNC: 1.22 MG/DL (ref 0.6–1.3)
ERYTHROCYTE [DISTWIDTH] IN BLOOD BY AUTOMATED COUNT: 12.8 % (ref 11.6–15.1)
GFR SERPL CREATININE-BSD FRML MDRD: 74 ML/MIN/1.73SQ M
GLUCOSE SERPL-MCNC: 102 MG/DL (ref 65–140)
HCT VFR BLD AUTO: 32.7 % (ref 36.5–49.3)
HGB BLD-MCNC: 10.9 G/DL (ref 12–17)
MAGNESIUM SERPL-MCNC: 2.3 MG/DL (ref 1.6–2.6)
MCH RBC QN AUTO: 32.1 PG (ref 26.8–34.3)
MCHC RBC AUTO-ENTMCNC: 33.3 G/DL (ref 31.4–37.4)
MCV RBC AUTO: 96 FL (ref 82–98)
PHOSPHATE SERPL-MCNC: 2.8 MG/DL (ref 2.7–4.5)
PLATELET # BLD AUTO: 167 THOUSANDS/UL (ref 149–390)
PMV BLD AUTO: 10.4 FL (ref 8.9–12.7)
POTASSIUM SERPL-SCNC: 3.8 MMOL/L (ref 3.5–5.3)
RBC # BLD AUTO: 3.4 MILLION/UL (ref 3.88–5.62)
SODIUM SERPL-SCNC: 139 MMOL/L (ref 136–145)
WBC # BLD AUTO: 10.2 THOUSAND/UL (ref 4.31–10.16)

## 2022-07-11 PROCEDURE — 84100 ASSAY OF PHOSPHORUS: CPT | Performed by: ORTHOPAEDIC SURGERY

## 2022-07-11 PROCEDURE — 97163 PT EVAL HIGH COMPLEX 45 MIN: CPT

## 2022-07-11 PROCEDURE — NC001 PR NO CHARGE: Performed by: ORTHOPAEDIC SURGERY

## 2022-07-11 PROCEDURE — 83735 ASSAY OF MAGNESIUM: CPT | Performed by: ORTHOPAEDIC SURGERY

## 2022-07-11 PROCEDURE — 97167 OT EVAL HIGH COMPLEX 60 MIN: CPT

## 2022-07-11 PROCEDURE — 85027 COMPLETE CBC AUTOMATED: CPT | Performed by: ORTHOPAEDIC SURGERY

## 2022-07-11 PROCEDURE — 99232 SBSQ HOSP IP/OBS MODERATE 35: CPT | Performed by: SURGERY

## 2022-07-11 PROCEDURE — 80048 BASIC METABOLIC PNL TOTAL CA: CPT | Performed by: ORTHOPAEDIC SURGERY

## 2022-07-11 RX ORDER — GABAPENTIN 100 MG/1
100 CAPSULE ORAL 3 TIMES DAILY
Status: DISCONTINUED | OUTPATIENT
Start: 2022-07-11 | End: 2022-07-13 | Stop reason: HOSPADM

## 2022-07-11 RX ORDER — OXYCODONE HYDROCHLORIDE 10 MG/1
10 TABLET ORAL EVERY 4 HOURS PRN
Status: DISCONTINUED | OUTPATIENT
Start: 2022-07-11 | End: 2022-07-13 | Stop reason: HOSPADM

## 2022-07-11 RX ORDER — LANOLIN ALCOHOL/MO/W.PET/CERES
9 CREAM (GRAM) TOPICAL
Status: DISCONTINUED | OUTPATIENT
Start: 2022-07-11 | End: 2022-07-13 | Stop reason: HOSPADM

## 2022-07-11 RX ADMIN — DULOXETINE 160 MG: 20 CAPSULE, DELAYED RELEASE ORAL at 21:45

## 2022-07-11 RX ADMIN — ACETAMINOPHEN 975 MG: 325 TABLET, FILM COATED ORAL at 11:10

## 2022-07-11 RX ADMIN — HYDROMORPHONE HYDROCHLORIDE 0.5 MG: 1 INJECTION, SOLUTION INTRAMUSCULAR; INTRAVENOUS; SUBCUTANEOUS at 09:35

## 2022-07-11 RX ADMIN — GABAPENTIN 100 MG: 100 CAPSULE ORAL at 08:13

## 2022-07-11 RX ADMIN — Medication 9 MG: at 01:01

## 2022-07-11 RX ADMIN — GABAPENTIN 100 MG: 100 CAPSULE ORAL at 21:45

## 2022-07-11 RX ADMIN — ACETAMINOPHEN 975 MG: 325 TABLET, FILM COATED ORAL at 16:50

## 2022-07-11 RX ADMIN — ACETAMINOPHEN 975 MG: 325 TABLET, FILM COATED ORAL at 05:31

## 2022-07-11 RX ADMIN — OXYCODONE HYDROCHLORIDE 15 MG: 10 TABLET ORAL at 21:45

## 2022-07-11 RX ADMIN — Medication 9 MG: at 21:44

## 2022-07-11 RX ADMIN — SENNOSIDES AND DOCUSATE SODIUM 2 TABLET: 8.6; 5 TABLET ORAL at 08:14

## 2022-07-11 RX ADMIN — CEFAZOLIN SODIUM 2000 MG: 2 SOLUTION INTRAVENOUS at 02:27

## 2022-07-11 RX ADMIN — HYDROMORPHONE HYDROCHLORIDE 0.5 MG: 1 INJECTION, SOLUTION INTRAMUSCULAR; INTRAVENOUS; SUBCUTANEOUS at 05:31

## 2022-07-11 RX ADMIN — ENOXAPARIN SODIUM 30 MG: 30 INJECTION SUBCUTANEOUS at 21:46

## 2022-07-11 RX ADMIN — HYDROMORPHONE HYDROCHLORIDE 0.5 MG: 1 INJECTION, SOLUTION INTRAMUSCULAR; INTRAVENOUS; SUBCUTANEOUS at 22:36

## 2022-07-11 RX ADMIN — HYDROMORPHONE HYDROCHLORIDE 0.5 MG: 1 INJECTION, SOLUTION INTRAMUSCULAR; INTRAVENOUS; SUBCUTANEOUS at 01:03

## 2022-07-11 RX ADMIN — OXYCODONE HYDROCHLORIDE 15 MG: 10 TABLET ORAL at 16:49

## 2022-07-11 RX ADMIN — GABAPENTIN 100 MG: 100 CAPSULE ORAL at 16:49

## 2022-07-11 RX ADMIN — OXYCODONE HYDROCHLORIDE 15 MG: 10 TABLET ORAL at 12:31

## 2022-07-11 RX ADMIN — OXYCODONE HYDROCHLORIDE 15 MG: 10 TABLET ORAL at 02:27

## 2022-07-11 RX ADMIN — HYDROMORPHONE HYDROCHLORIDE 0.5 MG: 1 INJECTION, SOLUTION INTRAMUSCULAR; INTRAVENOUS; SUBCUTANEOUS at 03:36

## 2022-07-11 RX ADMIN — ENOXAPARIN SODIUM 30 MG: 30 INJECTION SUBCUTANEOUS at 09:39

## 2022-07-11 RX ADMIN — HYDROMORPHONE HYDROCHLORIDE 0.5 MG: 1 INJECTION, SOLUTION INTRAMUSCULAR; INTRAVENOUS; SUBCUTANEOUS at 14:43

## 2022-07-11 RX ADMIN — OXYCODONE HYDROCHLORIDE 15 MG: 10 TABLET ORAL at 08:13

## 2022-07-11 RX ADMIN — HYDROMORPHONE HYDROCHLORIDE 0.5 MG: 1 INJECTION, SOLUTION INTRAMUSCULAR; INTRAVENOUS; SUBCUTANEOUS at 18:50
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
## 2022-07-11 NOTE — RESTORATIVE TECHNICIAN NOTE
Restorative Technician Note      Patient Name: Maria Esther Simms     Restorative Tech Visit Date: 7/11/2022  Note Type: Bracing, Initial consult  Patient Position Upon Consult: Supine  Brace Applied: Cam Walker Boot Standard (high, left leg)  Additional Brace Ordered: No  Patient Position When Brace Applied: Supine  Education Provided: Yes  Patient Position at End of Consult: Supine    Please call Mobility Coordinator at ext  [phone] or on [place] text " [facility]-PT-Restorative Tech" role in regards to bracing instruction and/or adjustment      General Meniga,
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
## 2022-07-11 NOTE — UTILIZATION REVIEW
Continued Stay Review    Date: 7/11                        Current Patient Class: Inpatient  Current Level of Care: Med Surg    HPI:37 y o  male initially admitted on 7/9    Assessment/Plan:   POD 1 insertion of left tibial IM nail and ORIF of left ankle posterior malleolus   WBAT LLE  Pain control  PT recommending home with home health pending progress       Vital Signs:   07/11/22 10:51:03 97 7 °F (36 5 °C) 93 -- 130/85 100 97 % -- -- -- --   07/11/22 07:33:03 98 7 °F (37 1 °C) 92 -- 130/86 101 97 % -- -- -- --   07/11/22 03:42:01 98 7 °F (37 1 °C) 86 -- 123/73 90 97 % -- -- --      Pertinent Labs/Diagnostic Results:       Results from last 7 days   Lab Units 07/11/22  0525 07/10/22  0617 07/09/22  1855   WBC Thousand/uL 10 20* 9 89 9 22   HEMOGLOBIN g/dL 10 9* 12 9 14 1   HEMATOCRIT % 32 7* 38 5 42 3   PLATELETS Thousands/uL 167 179 217   NEUTROS ABS Thousands/µL  --   --  6 47         Results from last 7 days   Lab Units 07/11/22  0525 07/10/22  0617 07/09/22  1855   SODIUM mmol/L 139 138 142   POTASSIUM mmol/L 3 8 4 8 3 9   CHLORIDE mmol/L 106 110* 106   CO2 mmol/L 29 26 24   ANION GAP mmol/L 4 2* 12   BUN mg/dL 14 20 18   CREATININE mg/dL 1 22 1 20 1 27   EGFR ml/min/1 73sq m 74 75 70   CALCIUM mg/dL 8 6 8 6 9 1   MAGNESIUM mg/dL 2 3  --   --    PHOSPHORUS mg/dL 2 8  --   --      Results from last 7 days   Lab Units 07/09/22  1855   AST U/L 25   ALT U/L 30   ALK PHOS U/L 55   TOTAL PROTEIN g/dL 7 2   ALBUMIN g/dL 4 1   TOTAL BILIRUBIN mg/dL 0 75         Results from last 7 days   Lab Units 07/11/22  0525 07/10/22  0617 07/09/22  1855   GLUCOSE RANDOM mg/dL 102 95 110         Results from last 7 days   Lab Units 07/09/22  1920   PROTIME seconds 12 8   INR  0 98   PTT seconds 24     Medications:   Scheduled Medications:  acetaminophen, 975 mg, Oral, Q6H JACK  DULoxetine, 160 mg, Oral, HS  enoxaparin, 30 mg, Subcutaneous, Q12H  gabapentin, 100 mg, Oral, TID  melatonin, 9 mg, Oral, HS  senna-docusate sodium, 2 tablet, Oral, Daily      Continuous IV Infusions: None     PRN Meds:  diphenhydrAMINE, 25 mg, Oral, Q6H PRN  HYDROmorphone, 0 5 mg, Intravenous, Q1H PRN 7/11 x5  naloxone, 0 04 mg, Intravenous, Q1MIN PRN  oxyCODONE, 10 mg, Oral, Q4H PRN  oxyCODONE, 15 mg, Oral, Q4H PRN 7/11 x3    Discharge Plan: D    Network Utilization Review Department  ATTENTION: Please call with any questions or concerns to [phone] and carefully listen to the prompts so that you are directed to the right person  All voicemails are confidential   Valeta Pastel all requests for admission clinical reviews, approved or denied determinations and any other requests to dedicated fax number below belonging to the campus where the patient is receiving treatment   List of dedicated fax numbers for the Facilities:  [address] DENIALS (Administrative/Medical Necessity) [phone]   [address] (Maternity/NICU/Pediatrics) [phone]   [address]  [address] [place] [address] [address] [place] [address] [address] [facility] [place] [address] [phone]
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
## 2022-07-11 NOTE — PROGRESS NOTES
Progress Note - Orthopedics   Javi Garcia 42 y o  male MRN: [id number]  [facility]      Subjective:    42 y o male  No acute events, no complaints  Pt doing well  Pain moderately controlled   Denies fevers chills, CP, SOB    Labs:  0   Lab Value Date/Time    HCT 32 7 (L) 07/11/2022 0525    HCT 38 5 07/10/2022 0617    HCT 42 3 07/09/2022 1855    HGB 10 9 (L) 07/11/2022 0525    HGB 12 9 07/10/2022 0617    HGB 14 1 07/09/2022 1855    INR 0 98 07/09/2022 1920    WBC 10 20 (H) 07/11/2022 0525    WBC 9 89 07/10/2022 0617    WBC 9 22 07/09/2022 1855       Meds:    Current Facility-Administered Medications:     acetaminophen (TYLENOL) tablet 975 mg, 975 mg, Oral, Q6H [facility], Katie Perez MD, 975 mg at 07/11/22 0531    diphenhydrAMINE (BENADRYL) tablet 25 mg, 25 mg, Oral, Q6H PRN, Katie Perez MD    DULoxetine (CYMBALTA) delayed release capsule 160 mg, 160 mg, Oral, HS, Katie Perez MD, 160 mg at 07/10/22 2146    enoxaparin (LOVENOX) subcutaneous injection 30 mg, 30 mg, Subcutaneous, Q12H, Katie Perez MD, 30 mg at 07/10/22 2148    gabapentin (NEURONTIN) capsule 100 mg, 100 mg, Oral, TID, Adelaida Boyer MD    HYDROmorphone (DILAUDID) injection 0 5 mg, 0 5 mg, Intravenous, Q1H PRN, Katie Perez MD, 0 5 mg at 07/11/22 0531    melatonin tablet 9 mg, 9 mg, Oral, HS, Adelaida Boyer MD, 9 mg at 07/11/22 0101    naloxone (NARCAN) 0 04 mg/mL syringe 0 04 mg, 0 04 mg, Intravenous, Q1MIN PRN, Katie Perez MD    oxyCODONE (ROXICODONE) immediate release tablet 10 mg, 10 mg, Oral, Q4H PRN, Adelaida Boyer MD    oxyCODONE (ROXICODONE) IR tablet 15 mg, 15 mg, Oral, Q4H PRN, Adelaida Boyer MD, 15 mg at 07/11/22 0227    senna-docusate sodium (SENOKOT S) 8 6-50 mg per tablet 2 tablet, 2 tablet, Oral, Daily, Katie Perez MD    Blood Culture:   No results found for: BLOODCX    Wound Culture:   No results found for: WOUNDCULT    Ins and Outs:  I/O last 24 hours: In: 6466 [P O :460; I V :1000]  Out: 0486 [Urine:1000; Blood:25]          Physical:  Vitals:    07/11/22 0342   BP: 123/73   Pulse: 86   Resp:    Temp: 98 7 °F (37 1 °C)   SpO2: 97%     Musculoskeletal: left Lower Extremity  · Dressing intact  Some saturation at the proximal medial portion   · TTP about incision sites   · Mild swelling about foot and ankle   · SILT s/s/sp/dp/t  +fhl/ehl, +ankle dorsi/plantar flexion    2+ DP pulse    Assessment:    37 y o male POD 1 insertion of left tibial IM nail and ORIF of left ankle posterior malleolus doing well this morning     Plan:  · WBAT LLE    · PT/OT  · Pain control  · DVT ppx  · Dispo: Ortho will follow     Viviana Mojica MD
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
## 2022-07-11 NOTE — PLAN OF CARE
Problem: PHYSICAL THERAPY ADULT  Goal: Performs mobility at highest level of function for planned discharge setting  See evaluation for individualized goals  Description: Treatment/Interventions: Functional transfer training, LE strengthening/ROM, Therapeutic exercise, Elevations, Endurance training, Patient/family training, Equipment eval/education, Bed mobility, Gait training, Spoke to nursing, OT  Equipment Recommended: [place] Cranker       See flowsheet documentation for full assessment, interventions and recommendations  Note: Prognosis: Good  Problem List: Decreased strength, Decreased endurance, Decreased range of motion, Impaired balance, Decreased mobility, Pain, Orthopedic restrictions  Assessment: Pt is 42 y o  male seen for PT evaluation s/p admit to [facility] on 7/9/2022  Two pt identifiers were used to confirm  Pt presented s/p FCI  Pt originally presented at [facility] and transferred to [facility] for further medical management/ evaluation  Pt was admitted with a primary dx of:  Left tibial shaft fracture, posterior malleolus fracture, and other active problems including right elbow laceration, left wrist pain, PTSD  Patient underwent INSERTION NAIL IM TIBIA (Left); OPEN REDUCTION W/ INTERNAL FIXATION (ORIF) ANKLE (Left) which was performed on 07/10/2022  PT now consulted for assessment of mobility and d/c needs  Pt with Up in chair orders  Pts current co morbidities affecting treatment include:  Arthritis, PTSD, and personal factors including steps to manage at home  Pts current clinical presentation is Unstable/ Unpredictable (high complexity) due to Ongoing medical management for primary dx, Increased reliance on more restrictive AD compared to baseline, Decreased activity tolerance compared to baseline, Fall risk, Increased assistance needed from caregiver at current time, Continuous pulse oximetry monitoring , s/p surgical intervention      Upon evaluation, pt currently is requiring Min Ax1 for bed mobility; Min Ax1 for transfers and Min Ax1 for ambulation w/ RW  Pt presents at PT eval functioning below baseline and currently w/ overall mobility deficits 2* to: LLE weakness, decreased ROM, impaired balance, decreased endurance, gait deviations, pain, decreased activity tolerance compared to baseline, orthopedic restrictions  Pt currently at a fall risk 2* to impairments listed above  Based on the aforementioned PT evaluation, pt will continue to benefit from skilled Acute PT interventions to address stated impairments; to maximize functional mobility; for ongoing pt/ family training; and DME needs  At conclusion of PT session pt returned back in chair with phone and call bell within reach  Pt denies any further questions at this time  PT is currently recommending Home with increased support, home PT pending progress  PT will continue to follow during hospital stay  PT Discharge Recommendation: Home with home health rehabilitation (pending progress)          See flowsheet documentation for full assessment

## 2022-07-12 PROCEDURE — 97530 THERAPEUTIC ACTIVITIES: CPT

## 2022-07-12 PROCEDURE — 97535 SELF CARE MNGMENT TRAINING: CPT

## 2022-07-12 PROCEDURE — 97116 GAIT TRAINING THERAPY: CPT

## 2022-07-12 PROCEDURE — NC001 PR NO CHARGE: Performed by: ORTHOPAEDIC SURGERY

## 2022-07-12 PROCEDURE — 99232 SBSQ HOSP IP/OBS MODERATE 35: CPT | Performed by: EMERGENCY MEDICINE

## 2022-07-12 PROCEDURE — 97110 THERAPEUTIC EXERCISES: CPT

## 2022-07-12 RX ORDER — AMOXICILLIN 250 MG
2 CAPSULE ORAL DAILY
Qty: 28 TABLET | Refills: 0 | Status: SHIPPED | OUTPATIENT
Start: 2022-07-13 | End: 2022-07-27

## 2022-07-12 RX ORDER — DIPHENHYDRAMINE HCL 25 MG
25 TABLET ORAL EVERY 6 HOURS PRN
Qty: 30 TABLET | Refills: 0 | Status: SHIPPED | OUTPATIENT
Start: 2022-07-12

## 2022-07-12 RX ORDER — ENOXAPARIN SODIUM 100 MG/ML
30 INJECTION SUBCUTANEOUS EVERY 12 HOURS
Qty: 16.8 ML | Refills: 0 | Status: SHIPPED | OUTPATIENT
Start: 2022-07-12 | End: 2022-08-09

## 2022-07-12 RX ORDER — OXYCODONE HYDROCHLORIDE 10 MG/1
10 TABLET ORAL EVERY 4 HOURS PRN
Qty: 30 TABLET | Refills: 0 | Status: SHIPPED | OUTPATIENT
Start: 2022-07-12 | End: 2022-07-22

## 2022-07-12 RX ORDER — POLYETHYLENE GLYCOL 3350 17 G/17G
17 POWDER, FOR SOLUTION ORAL DAILY
Status: DISCONTINUED | OUTPATIENT
Start: 2022-07-12 | End: 2022-07-13 | Stop reason: HOSPADM

## 2022-07-12 RX ORDER — ACETAMINOPHEN 325 MG/1
975 TABLET ORAL EVERY 6 HOURS SCHEDULED
Qty: 84 TABLET | Refills: 0 | Status: SHIPPED | OUTPATIENT
Start: 2022-07-12 | End: 2022-07-19

## 2022-07-12 RX ORDER — GABAPENTIN 100 MG/1
100 CAPSULE ORAL 3 TIMES DAILY
Qty: 42 CAPSULE | Refills: 0 | Status: SHIPPED | OUTPATIENT
Start: 2022-07-12 | End: 2022-07-26

## 2022-07-12 RX ADMIN — HYDROMORPHONE HYDROCHLORIDE 0.5 MG: 1 INJECTION, SOLUTION INTRAMUSCULAR; INTRAVENOUS; SUBCUTANEOUS at 23:28

## 2022-07-12 RX ADMIN — OXYCODONE HYDROCHLORIDE 15 MG: 10 TABLET ORAL at 03:48

## 2022-07-12 RX ADMIN — ACETAMINOPHEN 975 MG: 325 TABLET, FILM COATED ORAL at 11:26

## 2022-07-12 RX ADMIN — SENNOSIDES AND DOCUSATE SODIUM 2 TABLET: 8.6; 5 TABLET ORAL at 08:32

## 2022-07-12 RX ADMIN — ACETAMINOPHEN 975 MG: 325 TABLET, FILM COATED ORAL at 05:38

## 2022-07-12 RX ADMIN — HYDROMORPHONE HYDROCHLORIDE 0.5 MG: 1 INJECTION, SOLUTION INTRAMUSCULAR; INTRAVENOUS; SUBCUTANEOUS at 11:26

## 2022-07-12 RX ADMIN — Medication 9 MG: at 21:28

## 2022-07-12 RX ADMIN — ACETAMINOPHEN 975 MG: 325 TABLET, FILM COATED ORAL at 17:01

## 2022-07-12 RX ADMIN — GABAPENTIN 100 MG: 100 CAPSULE ORAL at 16:46

## 2022-07-12 RX ADMIN — ENOXAPARIN SODIUM 30 MG: 30 INJECTION SUBCUTANEOUS at 22:41

## 2022-07-12 RX ADMIN — DULOXETINE 160 MG: 20 CAPSULE, DELAYED RELEASE ORAL at 21:27

## 2022-07-12 RX ADMIN — OXYCODONE HYDROCHLORIDE 15 MG: 10 TABLET ORAL at 21:28

## 2022-07-12 RX ADMIN — ENOXAPARIN SODIUM 30 MG: 30 INJECTION SUBCUTANEOUS at 11:28

## 2022-07-12 RX ADMIN — OXYCODONE HYDROCHLORIDE 15 MG: 10 TABLET ORAL at 08:32

## 2022-07-12 RX ADMIN — HYDROMORPHONE HYDROCHLORIDE 0.5 MG: 1 INJECTION, SOLUTION INTRAMUSCULAR; INTRAVENOUS; SUBCUTANEOUS at 05:38

## 2022-07-12 RX ADMIN — HYDROMORPHONE HYDROCHLORIDE 0.5 MG: 1 INJECTION, SOLUTION INTRAMUSCULAR; INTRAVENOUS; SUBCUTANEOUS at 00:26

## 2022-07-12 RX ADMIN — HYDROMORPHONE HYDROCHLORIDE 0.5 MG: 1 INJECTION, SOLUTION INTRAMUSCULAR; INTRAVENOUS; SUBCUTANEOUS at 16:46

## 2022-07-12 RX ADMIN — GABAPENTIN 100 MG: 100 CAPSULE ORAL at 08:32

## 2022-07-12 RX ADMIN — ACETAMINOPHEN 975 MG: 325 TABLET, FILM COATED ORAL at 00:25

## 2022-07-12 RX ADMIN — GABAPENTIN 100 MG: 100 CAPSULE ORAL at 21:28

## 2022-07-12 RX ADMIN — OXYCODONE HYDROCHLORIDE 15 MG: 10 TABLET ORAL at 14:22

## 2022-07-12 RX ADMIN — POLYETHYLENE GLYCOL 3350 17 G: 17 POWDER, FOR SOLUTION ORAL at 08:32
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
## 2022-07-12 NOTE — PLAN OF CARE
Problem: PHYSICAL THERAPY ADULT  Goal: Performs mobility at highest level of function for planned discharge setting  See evaluation for individualized goals  Description: Treatment/Interventions: Functional transfer training, LE strengthening/ROM, Therapeutic exercise, Elevations, Endurance training, Patient/family training, Equipment eval/education, Bed mobility, Gait training, Spoke to nursing, OT  Equipment Recommended: Magan Arechiga       See flowsheet documentation for full assessment, interventions and recommendations  Outcome: Progressing  Note: Prognosis: Good  Problem List: Decreased strength, Decreased range of motion, Decreased endurance, Decreased mobility, Pain, Orthopedic restrictions  Assessment: Patient resting in bed at time of PT treatment session  Patient continues to report left LE pain, but is willing and motivated to participate with PT  Patient was able to perform all bed mobility and transfers with supervision which is slightly improved compared to previous session however slight cuing still required for proper hand placement during sit to stand transfers  Patient was able to tolerate increased ambulation distances with supervision and the use of a rolling walker which is also improved compared to previous session however distances remain limited by fatigue and pain  Patient utilize step to gait pattern and was noted to have forward flexed posture and decreased stance time on left  No gross loss of balance noted with gait training  Stair negotiation was attempted this session and patient was able to ascend and descend 3 steps utilizing axillary crutch on right and handrail on left  No gross loss of balance noted with stair training, but slight cuing was required for proper lower extremity sequencing on stairs  Patient reported having 1+1 steps to enter home with landing so curb step was attempted  Patient was able to perform curb step utilizing rolling walker with Min a x1  Additionally, patient was able to tolerate and perform all lower extremity TherEx seated out of bed in chair without any increase in complaints  PT discussed discharge needs with patient and patient reports he has friends and a wife who is in the process of getting a divorce from who could provide assistance if needed  Patient was provided with axillary crutches during PT treatment session  At conclusion of PT treatment session patient was assisted back into chair with all needs within reach  PT will continue to follow, D/C recommendation when medically cleared is home with support, home PT  Barriers to Discharge: None  Barriers to Discharge Comments: Patient denies any mobility or safety concerns about returning home at time of discharge  PT Discharge Recommendation: Home with home health rehabilitation    See flowsheet documentation for full assessment
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
## 2022-07-12 NOTE — PLAN OF CARE
Problem: OCCUPATIONAL THERAPY ADULT  Goal: Performs self-care activities at highest level of function for planned discharge setting  See evaluation for individualized goals  Description: Treatment Interventions: ADL retraining, Functional transfer training, Endurance training, Patient/family training, Equipment evaluation/education, Compensatory technique education, Energy conservation, Activityengagement  Equipment Recommended: Bedside commode       See flowsheet documentation for full assessment, interventions and recommendations  7/12/2022 1648 by Ellen Cornell  Outcome: Adequate for Discharge  Note: Limitation: Decreased ADL status, Decreased endurance, Decreased sensation, Decreased self-care trans, Decreased high-level ADLs  Prognosis: Good  Assessment: Pt was seen for an OT tx session on 7/12/2022  Pt progressed well during session  Pt completed LB dressing by threading b/l LE in pants with set up  Pt completed functional mobility at a supervision level w/ RW from recliner chair to standard commode over toilet to prepare for discharge home  Pt completed grooming at the sink for 5 mins to complete teeth hygiene, and washing face at a supervision/set up level w/ RW  Pt was educated extensively on LB compensatory techniques, energy conservation techniques, proper use of AD when completing IADLs (using to-go containers to transport food, water bottles, "counter top hopping", and/or bag in front of walker), proper use of commode (over toilet), preparatory strategies to implement to increased activity tolerance, and increased family/friend support to complete IADLs, and community mobility PRN  OT recommends pt returns home with increased family support  Pt reports wife (soon to be ex-wife) lives 15 minutes away and currently assisting while in hospital  Pt does not have any questions or concerns at this time  Pt no longer requires additional acute care OT services  D/c OT       OT Discharge Recommendation: No rehabilitation needs  OT - OK to Discharge- Retiring Row: Yes    7/12/2022 1648 by Bryson Clemens  Note: Limitation: Decreased ADL status, Decreased endurance, Decreased sensation, Decreased self-care trans, Decreased high-level ADLs  Prognosis: Good  Assessment: Pt was seen for an OT tx session on 7/12/2022  Pt progressed well during session  Pt completed LB dressing by threading b/l LE in pants with set up  Pt completed functional mobility at a supervision level w/ RW from recliner chair to standard commode over toilet to prepare for discharge home  Pt completed grooming at the sink for 5 mins to complete teeth hygiene, and washing face at a supervision/set up level w/ RW  Pt was educated extensively on LB compensatory techniques, energy conservation techniques, proper use of AD when completing IADLs (using to-go containers to transport food, water bottles, "counter top hopping", and/or bag in front of walker), proper use of commode (over toilet), preparatory strategies to implement to increased activity tolerance, and increased family/friend support to complete IADLs, and community mobility PRN  OT recommends pt returns home with increased family support  Pt reports wife (soon to be ex-wife) lives 15 minutes away and currently assisting while in hospital  Pt does not have any questions or concerns at this time  Pt no longer requires additional acute care OT services  D/c OT       OT Discharge Recommendation: No rehabilitation needs  OT - OK to Discharge- Retiring Row: Yes
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
## 2022-07-12 NOTE — DISCHARGE INSTRUCTIONS
Discharge Instructions - Orthopedics  Robert Caal 42 y o  male MRN: [id number]  Unit/Bed#: [facility] 613-01    Weight Bearing Status:                                           Weight bearing as tolerated    DVT prophylaxis  Continue Lovenox for 28 days post surgery    Pain:  Continue analgesics as directed    Dressing Instructions:   Please keep clean, dry and intact until follow up     Appt Instructions:   Please schedule follow up appointment in [place] for 2 weeks from surgery  If there are any issues please feel encouraged to schedule an appointment with Dr Ye Christopher  You can call the [address] at [phone] to schedule  Otherwise followup as scheduled     Contact the office sooner if you experience any increased numbness/tingling in the extremities
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
## 2022-07-12 NOTE — PROGRESS NOTES
Progress Note - Orthopedics   Javi Garcia 42 y o  male MRN: [id number]  [facility]      Subjective:    42 y o male POD 2 L tib IMN, ORIF posterior malleolus  Doing well, pain controlled  Some difficulty with ambulating      Labs:  0   Lab Value Date/Time    HCT 32 7 (L) 07/11/2022 0525    HCT 38 5 07/10/2022 0617    HCT 42 3 07/09/2022 1855    HGB 10 9 (L) 07/11/2022 0525    HGB 12 9 07/10/2022 0617    HGB 14 1 07/09/2022 1855    INR 0 98 07/09/2022 1920    WBC 10 20 (H) 07/11/2022 0525    WBC 9 89 07/10/2022 0617    WBC 9 22 07/09/2022 1855       Meds:    Current Facility-Administered Medications:     acetaminophen (TYLENOL) tablet 975 mg, 975 mg, Oral, Q6H [address], Katie Perez MD, 975 mg at 07/12/22 0538    diphenhydrAMINE (BENADRYL) tablet 25 mg, 25 mg, Oral, Q6H PRN, Katie Perez MD    DULoxetine (CYMBALTA) delayed release capsule 160 mg, 160 mg, Oral, HS, Katie Perez MD, 160 mg at 07/11/22 2145    enoxaparin (LOVENOX) subcutaneous injection 30 mg, 30 mg, Subcutaneous, Q12H, Katie Perez MD, 30 mg at 07/11/22 2146    gabapentin (NEURONTIN) capsule 100 mg, 100 mg, Oral, TID, Adelaida Boyer MD, 100 mg at 07/11/22 2145    HYDROmorphone (DILAUDID) injection 0 5 mg, 0 5 mg, Intravenous, Q1H PRN, Katie Perez MD, 0 5 mg at 07/12/22 0538    melatonin tablet 9 mg, 9 mg, Oral, HS, Adelaida Boyer MD, 9 mg at 07/11/22 2144    naloxone (NARCAN) 0 04 mg/mL syringe 0 04 mg, 0 04 mg, Intravenous, Q1MIN PRN, Katie Perez MD    oxyCODONE (ROXICODONE) immediate release tablet 10 mg, 10 mg, Oral, Q4H PRN, Adelaida Boyer MD    oxyCODONE (ROXICODONE) IR tablet 15 mg, 15 mg, Oral, Q4H PRN, Adelaida Boyer MD, 15 mg at 07/12/22 0348    senna-docusate sodium (SENOKOT S) 8 6-50 mg per tablet 2 tablet, 2 tablet, Oral, Daily, Katie Perez MD, 2 tablet at 07/11/22 0814    Blood Culture:   No results found for: BLOODCX    Wound Culture:   No results found for: WOUNDCULT    Ins and Outs:  I/O last 24 hours: In: -   Out: 2900 [Urine:2900]          Physical:  Vitals:    07/12/22 0614   BP: 135/82   Pulse: 95   Resp: 16   Temp: 98 9 °F (37 2 °C)   SpO2: 98%     Musculoskeletal: left Lower Extremity  · Dressing in place without significant drainage  · TTP knee and ankle  · Sensation intact to light touch joanne/saph/sp/dp/tib  · Motor intact EHL/FHL  · 2+ DP pulse    Assessment:    37 y o male POD 2 L tib IMN, ORIF posterior malleolus  Doing well  Plan:  · WBAT LLE  · Greater than 2 gram drop which qualifies for diagnosis of acute blood loss anemia, will monitor and administer IVF/prbc as indicated  · PT/OT  · Pain control  · DVT PPX: lovenox x 28 days post op  · Dispo:  Ok to discharge from ortho standpoint    Alex Martino MD

## 2022-07-13 VITALS
OXYGEN SATURATION: 98 % | RESPIRATION RATE: 16 BRPM | DIASTOLIC BLOOD PRESSURE: 88 MMHG | SYSTOLIC BLOOD PRESSURE: 144 MMHG | HEART RATE: 97 BPM | TEMPERATURE: 98.4 F

## 2022-07-13 PROCEDURE — 99238 HOSP IP/OBS DSCHRG MGMT 30/<: CPT | Performed by: NURSE PRACTITIONER

## 2022-07-13 PROCEDURE — 97110 THERAPEUTIC EXERCISES: CPT

## 2022-07-13 PROCEDURE — 97530 THERAPEUTIC ACTIVITIES: CPT

## 2022-07-13 PROCEDURE — NC001 PR NO CHARGE: Performed by: ORTHOPAEDIC SURGERY

## 2022-07-13 PROCEDURE — 97116 GAIT TRAINING THERAPY: CPT

## 2022-07-13 RX ADMIN — ACETAMINOPHEN 975 MG: 325 TABLET, FILM COATED ORAL at 11:54

## 2022-07-13 RX ADMIN — ENOXAPARIN SODIUM 30 MG: 30 INJECTION SUBCUTANEOUS at 11:54

## 2022-07-13 RX ADMIN — OXYCODONE HYDROCHLORIDE 15 MG: 10 TABLET ORAL at 16:15

## 2022-07-13 RX ADMIN — OXYCODONE HYDROCHLORIDE 15 MG: 10 TABLET ORAL at 06:01

## 2022-07-13 RX ADMIN — ACETAMINOPHEN 975 MG: 325 TABLET, FILM COATED ORAL at 06:00

## 2022-07-13 RX ADMIN — GABAPENTIN 100 MG: 100 CAPSULE ORAL at 09:25

## 2022-07-13 RX ADMIN — HYDROMORPHONE HYDROCHLORIDE 0.5 MG: 1 INJECTION, SOLUTION INTRAMUSCULAR; INTRAVENOUS; SUBCUTANEOUS at 09:25

## 2022-07-13 RX ADMIN — OXYCODONE HYDROCHLORIDE 10 MG: 10 TABLET ORAL at 11:56

## 2022-07-13 RX ADMIN — OXYCODONE HYDROCHLORIDE 15 MG: 10 TABLET ORAL at 02:14

## 2022-07-13 RX ADMIN — GABAPENTIN 100 MG: 100 CAPSULE ORAL at 16:15
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
## 2022-07-13 NOTE — PLAN OF CARE
Problem: PHYSICAL THERAPY ADULT  Goal: Performs mobility at highest level of function for planned discharge setting  See evaluation for individualized goals  Description: Treatment/Interventions: Functional transfer training, LE strengthening/ROM, Therapeutic exercise, Elevations, Endurance training, Patient/family training, Equipment eval/education, Bed mobility, Gait training, Spoke to nursing, OT  Equipment Recommended: Jenniffer Ray       See flowsheet documentation for full assessment, interventions and recommendations  Outcome: Progressing  Note: Prognosis: Good  Problem List: Decreased strength, Decreased range of motion, Decreased endurance, Decreased mobility, Pain, Orthopedic restrictions  Assessment: Patient resting out of bed in chair at time of PT treatment session  Patient continues to report left lower extremity pain however is willing and motivated to participate with PT  Patient able to perform all transfers with supervision with slight cuing required for proper hand placement  Patient able to tolerate increased ambulation distances with supervision and the use of a rolling walker however distances remain limited by fatigue and pain  No gross loss of balance noted with gait training however patient was noted to have forward flexed posture, decreased step length bilaterally, decreased stance time on left  Stair negotiation was attempted this session and patient was able to ascend descend 1 curb step with supervision which is much improved compared to previous PT session  Slight cuing was still required for proper lower extremity sequencing on stairs  Additionally, patient is able tolerate perform all lower extremity TherEx seated out of bed in chair without any increase in complaints  Slight physical assistance required to perform lower extremity exercises due to fatigue and pain  At conclusion of PT treatment session patient was assisted back into chair with all needs within reach    Overall patient continues to make progress with PT will continue to benefit from skilled PT services during hospital stay  D/C recommendation when medically cleared is home with support and OPPT when appropriate  Barriers to Discharge: None  Barriers to Discharge Comments: Patient denies any mobility or safety concerns about returning home at time of discharge  PT Discharge Recommendation: Home with outpatient rehabilitation (home with support, OPPT when appropriate)    See flowsheet documentation for full assessment

## 2022-07-13 NOTE — ASSESSMENT & PLAN NOTE
· Ortho consulted and note appreciated  · 7/10 ORIF of left tibia  · Cam boot, weight-bearing as tolerated in left lower extremity    · Multimodal pain regiment  · DVT prophylaxis times 28 days  · Follow-up with orthopedics as an outpatient

## 2022-07-13 NOTE — UTILIZATION REVIEW
Continued Stay Review    Date: 7/12/2022                       Current Patient Class: IP  Current Level of Care: John Workmann y o  male initially admitted on 7/9  OR 7/10 for INSERTION NAIL IM TIBIA (Left)  OPEN REDUCTION W/ INTERNAL FIXATION (ORIF) ANKLE (Left)       Assessment/Plan: POD 2 L tib IMN, ORIF posterior malleolus  Some difficulty with ambulating  Still with left leg pain - required dilaudid x 5  No bm since fri - added miralax and mom today  Anticipate discharge to home tomorrow      Vital Signs:   07/13/22 07:18:06 98 3 °F (36 8 °C) 89 -- 145/82 103 97 % --   07/13/22 07:16:39 98 3 °F (36 8 °C) 90 -- 150/95 113 96 % --   07/13/22 07:16:27 98 3 °F (36 8 °C) 89 -- 150/95 113 98 % --   07/13/22 0000 -- -- -- -- -- -- None (Room air)   07/12/22 23:32:56 98 6 °F (37 °C) 91 18 99/69 79 96 % --   07/12/22 19:21:53 98 7 °F (37 1 °C) 107 Abnormal  20 135/85 102 99 % None (Room air)   07/12/22 16:07:39 98 4 °F (36 9 °C) 95 -- 137/84 102 99 % --   07/12/22 10:38:15 97 9 °F (36 6 °C) 99 16 132/86 101 99 % --   07/12/22 06:14:04 98 9 °F (37 2 °C) 95 16 135/82 100 98 % --   07/12/22 02:18:36 99 4 °F (37 4 °C) 94 18 130/84 99 94 % --       Pertinent Labs/Diagnostic Results:     Results from last 7 days   Lab Units 07/11/22  0525 07/10/22  0617 07/09/22  1855   WBC Thousand/uL 10 20* 9 89 9 22   HEMOGLOBIN g/dL 10 9* 12 9 14 1   HEMATOCRIT % 32 7* 38 5 42 3   PLATELETS Thousands/uL 167 179 217   NEUTROS ABS Thousands/µL  --   --  6 47     Results from last 7 days   Lab Units 07/11/22  0525 07/10/22  0617 07/09/22  1855   SODIUM mmol/L 139 138 142   POTASSIUM mmol/L 3 8 4 8 3 9   CHLORIDE mmol/L 106 110* 106   CO2 mmol/L 29 26 24   ANION GAP mmol/L 4 2* 12   BUN mg/dL 14 20 18   CREATININE mg/dL 1 22 1 20 1 27   EGFR ml/min/1 73sq m 74 75 70   CALCIUM mg/dL 8 6 8 6 9 1   MAGNESIUM mg/dL 2 3  --   --    PHOSPHORUS mg/dL 2 8  --   --      Results from last 7 days   Lab Units 07/09/22  1855   AST U/L 25   ALT U/L 30   ALK PHOS U/L 55   TOTAL PROTEIN g/dL 7 2   ALBUMIN g/dL 4 1   TOTAL BILIRUBIN mg/dL 0 75     Results from last 7 days   Lab Units 07/11/22  0525 07/10/22  0617 07/09/22  1855   GLUCOSE RANDOM mg/dL 102 95 110     Results from last 7 days   Lab Units 07/09/22  1920   PROTIME seconds 12 8   INR  0 98   PTT seconds 24       Medications:   Scheduled Medications:  acetaminophen, 975 mg, Oral, Q6H JACK  DULoxetine, 160 mg, Oral, HS  enoxaparin, 30 mg, Subcutaneous, Q12H  gabapentin, 100 mg, Oral, TID  melatonin, 9 mg, Oral, HS  polyethylene glycol, 17 g, Oral, Daily  senna-docusate sodium, 2 tablet, Oral, Daily    Continuous IV Infusions:     PRN Meds:  diphenhydrAMINE, 25 mg, Oral, Q6H PRN  HYDROmorphone, 0 5 mg, Intravenous, Q1H PRN x 5 7/12  And x 1 thus far 7/13  magnesium hydroxide, 30 mL, Oral, Daily PRN  naloxone, 0 04 mg, Intravenous, Q1MIN PRN  oxyCODONE, 10 mg, Oral, Q4H PRN  oxyCODONE, 15 mg, Oral, Q4H PRN x 4 7/12  & x 2 7/13      Discharge Plan: See above  Network Utilization Review Department  ATTENTION: Please call with any questions or concerns to [phone] and carefully listen to the prompts so that you are directed to the right person  All voicemails are confidential   [facility] all requests for admission clinical reviews, approved or denied determinations and any other requests to dedicated fax number below belonging to the campus where the patient is receiving treatment   List of dedicated fax numbers for the Facilities:  [address] DENIALS (Administrative/Medical Necessity) [phone]   [address] (Maternity/NICU/Pediatrics) [phone]   [address] 40 125 [facility]  [address] [facility] [address]   192 [facility]   [address] [place] [address] Sarthak Jacob 1481 [address] [phone]

## 2022-07-13 NOTE — PROGRESS NOTES
Progress Note - Orthopedics   Shaheen Petite 42 y o  male MRN: [id number]  [facility]      Subjective:    42 y o male POD 3 L tib IMN, ORIF post mal fx  Doing well  Pain controlled, wants to go home       Labs:  0   Lab Value Date/Time    HCT 32 7 (L) 07/11/2022 0525    HCT 38 5 07/10/2022 0617    HCT 42 3 07/09/2022 1855    HGB 10 9 (L) 07/11/2022 0525    HGB 12 9 07/10/2022 0617    HGB 14 1 07/09/2022 1855    INR 0 98 07/09/2022 1920    WBC 10 20 (H) 07/11/2022 0525    WBC 9 89 07/10/2022 0617    WBC 9 22 07/09/2022 1855       Meds:    Current Facility-Administered Medications:     acetaminophen (TYLENOL) tablet 975 mg, 975 mg, Oral, Q6H [facility], Stacy Kwon MD, 975 mg at 07/13/22 0600    diphenhydrAMINE (BENADRYL) tablet 25 mg, 25 mg, Oral, Q6H PRN, Stacy Kwon MD    DULoxetine (CYMBALTA) delayed release capsule 160 mg, 160 mg, Oral, HS, Stacy Kwon MD, 160 mg at 07/12/22 2127    enoxaparin (LOVENOX) subcutaneous injection 30 mg, 30 mg, Subcutaneous, Q12H, Stacy Kwon MD, 30 mg at 07/12/22 2241    gabapentin (NEURONTIN) capsule 100 mg, 100 mg, Oral, TID, Gwyn Gregory MD, 100 mg at 07/12/22 2128    HYDROmorphone (DILAUDID) injection 0 5 mg, 0 5 mg, Intravenous, Q1H PRN, Stacy Kwon MD, 0 5 mg at 07/12/22 2328    magnesium hydroxide (MILK OF MAGNESIA) oral suspension 30 mL, 30 mL, Oral, Daily PRN, RONALD Yates    melatonin tablet 9 mg, 9 mg, Oral, HS, Gwyn Gregory MD, 9 mg at 07/12/22 2128    naloxone (NARCAN) 0 04 mg/mL syringe 0 04 mg, 0 04 mg, Intravenous, Q1MIN PRN, Stacy Kwon MD    oxyCODONE (ROXICODONE) immediate release tablet 10 mg, 10 mg, Oral, Q4H PRN, Gwyn Gregory MD    oxyCODONE (ROXICODONE) IR tablet 15 mg, 15 mg, Oral, Q4H PRN, Gwyn Gregory MD, 15 mg at 07/13/22 0601    polyethylene glycol (MIRALAX) packet 17 g, 17 g, Oral, Daily, RONALD Yates, 17 g at 07/12/22 3419   senna-docusate sodium (SENOKOT S) 8 6-50 mg per tablet 2 tablet, 2 tablet, Oral, Daily, Elvin Casanova MD, 2 tablet at 07/12/22 9544    Blood Culture:   No results found for: BLOODCX    Wound Culture:   No results found for: WOUNDCULT    Ins and Outs:  I/O last 24 hours: In: 118 [P O :118]  Out: 1350 [Urine:1350]          Physical:  Vitals:    07/12/22 2332   BP: 99/69   Pulse: 91   Resp: 18   Temp: 98 6 °F (37 °C)   SpO2: 96%     Musculoskeletal: left Lower Extremity  · Dressing in place without significant drainage  · TTP knee and ankle  · Sensation intact to light touch joanne/saph/sp/dp/tib  · Motor intact EHL/FHL  · 2+ DP pulse    Assessment:    37 y o male POD 3 L tib IMN, ORIF post mal fx  Doing well  Plan:  · WBAT LLE in CAM  · Greater than 2 gram drop which qualifies for diagnosis of acute blood loss anemia, will monitor and administer IVF/prbc as indicated  · PT/OT  · Pain control  · DVT PPX: lovenox x 28 days post op  · Dispo:  Ok to discharge from ortho standpoint    Horacio Griffith MD

## 2022-07-13 NOTE — PHYSICAL THERAPY NOTE
PHYSICAL THERAPY NOTE          Patient Name: Albina Mathews  JTJUY'[id number] Date: 7/13/2022 07/13/22 0901   Note Type   Note Type Treatment   Pain Assessment   Pain Assessment Tool 0-10   Pain Score 5   Pain Location/Orientation Orientation: Left; Location: Leg   Pain Onset/Description Onset: Ongoing;Frequency: Constant/Continuous; Descriptor: Aching   Effect of Pain on Daily Activities Increased pain with activity   Patient's Stated Pain Goal No pain   Hospital Pain Intervention(s) Ambulation/increased activity;Repositioned   Restrictions/Precautions   Weight Bearing Precautions Per Order Yes   LLE Weight Bearing Per Order (S)  WBAT  (in CAM boot)   Braces or Orthoses CAM Boot  (LLE)   Other Precautions WBS;Pain   General   Chart Reviewed Yes   Response to Previous Treatment Patient with no complaints from previous session  Family/Caregiver Present No   Cognition   Overall Cognitive Status WFL   Subjective   Subjective Patient willing and motivated to participate with PT   Bed Mobility   Additional Comments NA, Pt seated OOB in chair at time of PT eval   Transfers   Sit to Stand 5  Supervision   Additional items Increased time required   Stand to Sit 5  Supervision   Additional items Increased time required   Ambulation/Elevation   Gait pattern Short stride; Foward flexed   Gait Assistance 5  Supervision   Assistive Device Rolling walker   Distance 65ft x 2  (seated rest break required)   Stair Management Assistance 5  Supervision   Stair Management Technique With walker   Number of Stairs 1  (curb step)   Balance   Static Sitting Fair +   Static Standing Fair   Ambulatory Fair -   Endurance Deficit   Endurance Deficit Yes   Endurance Deficit Description pain   Activity Tolerance   Activity Tolerance Patient limited by fatigue;Patient limited by pain   Nurse Made Aware Patient appropriate to be seen and mobilized per nursing Exercises   Heelslides Sitting;15 reps;AAROM; Left  (x 2 sets)   Knee AROM Long Arc Quad Sitting;15 reps;AAROM; Left  (x 2 sets)   Marching Sitting;15 reps;AAROM; Left  (x 2 sets)   Assessment   Prognosis Good   Problem List Decreased strength;Decreased range of motion;Decreased endurance;Decreased mobility;Pain;Orthopedic restrictions   Assessment Patient resting out of bed in chair at time of PT treatment session  Patient continues to report left lower extremity pain however is willing and motivated to participate with PT  Patient able to perform all transfers with supervision with slight cuing required for proper hand placement  Patient able to tolerate increased ambulation distances with supervision and the use of a rolling walker however distances remain limited by fatigue and pain  No gross loss of balance noted with gait training however patient was noted to have forward flexed posture, decreased step length bilaterally, decreased stance time on left  Stair negotiation was attempted this session and patient was able to ascend descend 1 curb step with supervision which is much improved compared to previous PT session  Slight cuing was still required for proper lower extremity sequencing on stairs  Additionally, patient is able tolerate perform all lower extremity TherEx seated out of bed in chair without any increase in complaints  Slight physical assistance required to perform lower extremity exercises due to fatigue and pain  At conclusion of PT treatment session patient was assisted back into chair with all needs within reach  Overall patient continues to make progress with PT will continue to benefit from skilled PT services during hospital stay    D/C recommendation when medically cleared is home with support, OPPT when appropriate   Barriers to Discharge None   Barriers to Discharge Comments Patient denies any mobility or safety concerns about returning home at time of discharge   Goals   Patient Goals " to go home"   [facility] Expiration Date 07/21/22   PT Treatment Day 2   Plan   Treatment/Interventions Functional transfer training;LE strengthening/ROM; Elevations; Therapeutic exercise; Endurance training;Patient/family training;Equipment eval/education; Bed mobility;Gait training;Spoke to nursing;Spoke to case management   Progress Progressing toward goals   PT Frequency Other (Comment)  (3-6x a week)   Recommendation   PT Discharge Recommendation Home with outpatient rehabilitation  (home with support, OPPT when appropriate)   Equipment Recommended Walker;Crutches   Ki 74 walker   Additional Comments Patient denies any mobility or safety concerns about returning home at time of discharge   Javid 8 in Bed Without Bedrails 4   Lying on Back to Sitting on Edge of Flat Bed 4   Moving Bed to Chair 4   Standing Up From Chair 3   Walk in Room 3   Climb 3-5 Stairs 3   Basic Mobility Inpatient Raw Score 21   Basic Mobility Standardized Score 45 55   Highest Level Of Mobility   JH-HLM Goal 6: Walk 10 steps or more   JH-HLM Achieved 7: Walk 25 feet or more   Portions of the documentation may have been created using voice recognition software  Occasional wrong word or sound alike substitutions may have occurred due to the inherent limitations of the voice recognition software  Read the chart carefully and recognize, using context, where substitutions have occurred      Yimi Calderon, PT, DPT

## 2022-07-13 NOTE — PLAN OF CARE
Problem: MOBILITY - ADULT  Goal: Maintain or return to baseline ADL function  Description: INTERVENTIONS:  -  Assess patient's ability to carry out ADLs; assess patient's baseline for ADL function and identify physical deficits which impact ability to perform ADLs (bathing, care of mouth/teeth, toileting, grooming, dressing, etc )  - Assess/evaluate cause of self-care deficits   - Assess range of motion  - Assess patient's mobility; develop plan if impaired  - Assess patient's need for assistive devices and provide as appropriate  - Encourage maximum independence but intervene and supervise when necessary  - Involve family in performance of ADLs  - Assess for home care needs following discharge   - Consider OT consult to assist with ADL evaluation and planning for discharge  - Provide patient education as appropriate  Outcome: Progressing  Goal: Maintains/Returns to pre admission functional level  Description: INTERVENTIONS:  - Perform BMAT or MOVE assessment daily    - Set and communicate daily mobility goal to care team and patient/family/caregiver  - Collaborate with rehabilitation services on mobility goals if consulted  - Perform Range of Motion  times a day  - Reposition patient every  hours    - Dangle patient  times a day  - Stand patient times a day  - Ambulate patient  times a day  - Out of bed to chair  times a day   - Out of bed for meals  times a day  - Out of bed for toileting  - Record patient progress and toleration of activity level   Outcome: Progressing     Problem: Potential for Falls  Goal: Patient will remain free of falls  Description: INTERVENTIONS:  - Educate patient/family on patient safety including physical limitations  - Instruct patient to call for assistance with activity   - Consult OT/PT to assist with strengthening/mobility   - Keep Call bell within reach  - Keep bed low and locked with side rails adjusted as appropriate  - Keep care items and personal belongings within reach  - Initiate and maintain comfort rounds  - Make Fall Risk Sign visible to staff  - Offer Toileting every  Hours, in advance of need  - Initiate/Maintain alarm  - Obtain necessary fall risk management equipment:  - Apply yellow socks and bracelet for high fall risk patients  - Consider moving patient to room near nurses station  Outcome: Progressing     Problem: Prexisting or High Potential for Compromised Skin Integrity  Goal: Skin integrity is maintained or improved  Description: INTERVENTIONS:  - Identify patients at risk for skin breakdown  - Assess and monitor skin integrity  - Assess and monitor nutrition and hydration status  - Monitor labs   - Assess for incontinence   - Turn and reposition patient  - Assist with mobility/ambulation  - Relieve pressure over bony prominences  - Avoid friction and shearing  - Provide appropriate hygiene as needed including keeping skin clean and dry  - Evaluate need for skin moisturizer/barrier cream  - Collaborate with interdisciplinary team   - Patient/family teaching  - Consider wound care consult   Outcome: Progressing

## 2022-07-14 NOTE — TELEPHONE ENCOUNTER
Left msg for patient to call office with how he is doing and if he will be following up with Dr Shakira Lantigua or local [facility]  Awaiting call back

## 2022-07-15 NOTE — UTILIZATION REVIEW
Notification of Discharge   This is a Notification of Discharge from our facility [address]  Please be advised that this patient has been discharge from our facility  Below you will find the admission and discharge date and time including the patients disposition  UTILIZATION REVIEW CONTACT:  Jamie Ellis  Utilization   Network Utilization Review Department  Phone: [phone] x carefully listen to the prompts  All voicemails are confidential   Email: [email or web address]     PHYSICIAN ADVISORY SERVICES:  FOR [id number] REVIEW - MEDICAL NECESSITY DENIAL  Phone: [phone]  Fax: [phone]  Email: [email or web address]     PRESENTATION DATE: 7/9/2022  8:36 PM  OBERVATION ADMISSION DATE:   INPATIENT ADMISSION DATE: 7/9/22 10:17 PM   DISCHARGE DATE: 7/13/2022  6:15 PM  DISPOSITION: Home/Self Care Home/Self Care      IMPORTANT INFORMATION:  Send all requests for admission clinical reviews, approved or denied determinations and any other requests to dedicated fax number below belonging to the campus where the patient is receiving treatment   List of dedicated fax numbers:  [address] DENIALS (Administrative/Medical Necessity) [phone]   [address] (Maternity/NICU/Pediatrics) [phone]   [facility] [phone]   130 [facility] [phone]   [address] [place] [address] [phone]   [address] [phone]   [facility]  [phone]   [address] [facility], [facility]  [address] [facility] [address] [facility] [phone]

## 2022-07-22 LAB
DME PARACHUTE DELIVERY DATE ACTUAL: NORMAL
DME PARACHUTE DELIVERY DATE REQUESTED: NORMAL
DME PARACHUTE ITEM DESCRIPTION: NORMAL
DME PARACHUTE ITEM DESCRIPTION: NORMAL
DME PARACHUTE ORDER STATUS: NORMAL
DME PARACHUTE SUPPLIER NAME: NORMAL
DME PARACHUTE SUPPLIER PHONE: NORMAL

## 2022-10-11 PROBLEM — S51.011A ELBOW LACERATION, RIGHT, INITIAL ENCOUNTER: Status: RESOLVED | Noted: 2022-07-09 | Resolved: 2022-10-11
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
## (undated) DEVICE — PENCIL ELECTROSURG E-Z CLEAN -0035H

## (undated) DEVICE — DRILL BIT/ CALIBRATED/ Ø4.2MM EXTRA-LONG

## (undated) DEVICE — CHLORAPREP HI-LITE 26ML ORANGE

## (undated) DEVICE — STOCKINETTE REGULAR

## (undated) DEVICE — 2.5MM REAMING ROD WITH BALL TIP/950MM-STERILE

## (undated) DEVICE — 4.2MM THREE-FLUTED DRILL BIT QC/NEEDLE POINT/145MM

## (undated) DEVICE — INTENDED FOR TISSUE SEPARATION, AND OTHER PROCEDURES THAT REQUIRE A SHARP SURGICAL BLADE TO PUNCTURE OR CUT.: Brand: BARD-PARKER SAFETY BLADES SIZE 10, STERILE

## (undated) DEVICE — SPONGE PVP SCRUB WING STERILE

## (undated) DEVICE — SUT VICRYL 2-0 CTB-1 36 IN JB945

## (undated) DEVICE — PROTECTION SLEEVE/ FLEX/ LONG FOR NAILS Ø 8-11MM / STERILE

## (undated) DEVICE — SILVER-COATED ANTIMICROBIAL BARRIER DRESSING: Brand: ACTICOAT   4" X 8"

## (undated) DEVICE — DRAPE EQUIPMENT RF WAND

## (undated) DEVICE — LOCKING SCREW FOR IM NAIL Ø 5MM/ 32MM/ XL25/ STERILE
Type: IMPLANTABLE DEVICE | Site: TIBIA | Status: NON-FUNCTIONAL
Removed: 2022-07-10

## (undated) DEVICE — TROCAR / LONG FOR NAILS Ø 8-11MM / STERILE

## (undated) DEVICE — PLUMEPEN PRO 10FT

## (undated) DEVICE — GLOVE INDICATOR PI UNDERGLOVE SZ 9 BLUE

## (undated) DEVICE — ACE WRAP 6 IN UNSTERILE

## (undated) DEVICE — PAD GROUNDING ADULT

## (undated) DEVICE — ABDOMINAL PAD: Brand: DERMACEA

## (undated) DEVICE — SUT VICRYL PLUS 1 CTB-1 36 IN VCPB947H

## (undated) DEVICE — BULB SYRINGE,IRRIGATION WITH PROTECTIVE CAP: Brand: DOVER

## (undated) DEVICE — 3.2MM GUIDE WIRE 400MM

## (undated) DEVICE — DRILL BIT 2.0MM

## (undated) DEVICE — KERLIX BANDAGE ROLL: Brand: KERLIX

## (undated) DEVICE — DRESSING XEROFORM 5 X 9

## (undated) DEVICE — GLOVE SRG BIOGEL 9

## (undated) DEVICE — GAUZE SPONGES,16 PLY: Brand: CURITY

## (undated) DEVICE — UNIVERSAL MAJOR EXTREMITY,KIT: Brand: CARDINAL HEALTH

## (undated) DEVICE — DRAPE SHEET THREE QUARTER

## (undated) DEVICE — 2.7MM THREE-FLUTED DRILL BIT QC/125MM

## (undated) DEVICE — SUT ETHILON 2-0 FSLX 30 IN 1674H

## (undated) DEVICE — DRAPE C-ARM X-RAY

## (undated) DEVICE — PADDING CAST 4 IN  COTTON STRL